# Patient Record
(demographics unavailable — no encounter records)

---

## 2025-02-24 NOTE — PHYSICAL EXAM
[de-identified] : splints removed  [Normal] : mucosa is normal [Midline] : trachea located in midline position

## 2025-02-24 NOTE — HISTORY OF PRESENT ILLNESS
[FreeTextEntry1] : Patient here s/p sinus surgery 02/18/25.   He had blood clots in nostril, was seen in [facility] over the weekend,

## 2025-02-24 NOTE — PROCEDURE
[Post-Op Patency] : post-op patency [Debridement] : debridement  [Congested] : congested [Nasal Mucosa] : bilateral purulence [Severe] : severe [Aayush] : on both sides [FreeTextEntry6] : The following anatomic sites were directly examined in a sequential fashion: The scope was introduced in the nasal passage between the middle and inferior turbinates to exam the inferior portion of the middle meatus and the fontanelle, as well as the maxillary ostia. Next, the scope was passed medically and posteriorly to the middle turbinates to examine the sphenoethmoid recess and the superior turbinate region.

## 2025-03-04 NOTE — HISTORY OF PRESENT ILLNESS
[FreeTextEntry1] : Patient returns today for clogged and itchy ears.  s/p sinus surgery 02/18/2025.  Patients states he still has some discomfort and breathing is improving.

## 2025-03-04 NOTE — REASON FOR VISIT
[Subsequent Evaluation] : a subsequent evaluation for [FreeTextEntry2] : s/p sinus surgery 02/18/2025

## 2025-03-04 NOTE — PROCEDURE
[Debridement] : debridement  [Rigid Endoscope] : examined with a rigid endoscope [Congested] : congested [Nasal Mucosa] : bilateral purulence [Severe] : severe [Aayush] : on both sides [Removed] : which was removed [FreeTextEntry6] : The following anatomic sites were directly examined in a sequential fashion: The scope was introduced in the nasal passage between the middle and inferior turbinates to exam the inferior portion of the middle meatus and the fontanelle, as well as the maxillary ostia. Next, the scope was passed medically and posteriorly to the middle turbinates to examine the sphenoethmoid recess and the superior turbinate region. post operative

## 2025-03-04 NOTE — PHYSICAL EXAM
[Nasal Endoscopy Performed] : nasal endoscopy was performed, see procedure section for findings [de-identified] : cerumen obstructing  [de-identified] : crusting  [de-identified] : thickened

## 2025-03-12 NOTE — PHYSICAL EXAM
Subjective    Pt seen for follow up ischemia  3rd toe right foot with necrotic wound to level of bone     Objective   Pt with out complaints of pain   Seen by Dr Reagan of IR yesterday   I/O's  No intake or output data in the 24 hours ending 12/08/21 0811    Last Recorded Vitals  Blood pressure (!) 165/68, pulse 85, temperature 97.9 °F (36.6 °C), temperature source Oral, resp. rate 12, height 5' 7\" (1.702 m), weight 64.5 kg (142 lb 3.2 oz), SpO2 99 %.  Body mass index is 22.27 kg/m².    Physical Exam     Lower Extremity Exam:    Vascular:  Pedal pulses are non palpable david feet   Skin is cool to touch   Neuro:  Pt has loss of protective sensation secondary to DM and PAD  Musculoskeletal:  No deformities    Wound Assessment   There is a  Necrotic dry wound 1.0  cm x 1.0 cm wound to level of bone at the lateral aspect of the  Right       Labs     Last WBC:  WBC (K/mcL)   Date Value   12/08/2021 8.2     A1c   10.6  BS  92  This am     Imaging       Assessment    1.  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  Diabetes Mellitis uncontrolled   3.  Peripheral neuropathy  3.  Smoking     Treatment     Wound assessment   Reviewed XR   Discussed tx  Plan for for further vascular evaluation by IR   CTA of the LE has been ordered for further evaluation and possible LE peripheral intervention      Applied  Betadine sol dressing between toes  Applied socks  david   Pt most likely will need amputation of the  3rd toe on this admission  Pending IR recommendations       Nancy Claudio DPM    [de-identified] : crusting

## 2025-03-12 NOTE — PROCEDURE
[Post-Op Patency] : post-op patency [Debridement] : debridement  [None] : none [Nasal Mucosa] : bilateral purulence [Severe] : severe [Aayush] : on both sides [Removed] : which was removed

## 2025-03-27 NOTE — PHYSICAL EXAM
[Nasal Endoscopy Performed] : nasal endoscopy was performed, see procedure section for findings [de-identified] : scabbing

## 2025-03-27 NOTE — PROCEDURE
[Debridement] : debridement  [None] : none [Rigid Endoscope] : examined with a rigid endoscope [Congested] : congested [Jeremías] : jeremías [Severe] : severe [Aayush] : on both sides [Removed] : which was removed [Bilateral] : bilateral debridement of the nasal cavity [FreeTextEntry6] : The following anatomic sites were directly examined in a sequential fashion: The scope was introduced in the nasal passage between the middle and inferior turbinates to exam the inferior portion of the middle meatus and the fontanelle, as well as the maxillary ostia. Next, the scope was passed medically and posteriorly to the middle turbinates to examine the sphenoethmoid recess and the superior turbinate region. post surgical  Patient states no history

## 2025-03-27 NOTE — HISTORY OF PRESENT ILLNESS
[FreeTextEntry1] : Pt returns to office s/p sinus surgery 02/18/25.  Pt states he is breathing well.